# Patient Record
Sex: FEMALE | Race: BLACK OR AFRICAN AMERICAN | Employment: OTHER | URBAN - METROPOLITAN AREA
[De-identification: names, ages, dates, MRNs, and addresses within clinical notes are randomized per-mention and may not be internally consistent; named-entity substitution may affect disease eponyms.]

---

## 2019-03-08 ENCOUNTER — HOSPITAL ENCOUNTER (EMERGENCY)
Age: 49
Discharge: HOME | End: 2019-03-08
Attending: EMERGENCY MEDICINE
Payer: COMMERCIAL

## 2019-03-08 VITALS
DIASTOLIC BLOOD PRESSURE: 76 MMHG | SYSTOLIC BLOOD PRESSURE: 125 MMHG | HEIGHT: 63 IN | OXYGEN SATURATION: 99 % | WEIGHT: 200 LBS | TEMPERATURE: 98.4 F | BODY MASS INDEX: 35.44 KG/M2 | HEART RATE: 65 BPM | RESPIRATION RATE: 14 BRPM

## 2019-03-08 DIAGNOSIS — S39.012A STRAIN OF LUMBAR REGION, INITIAL ENCOUNTER: Primary | ICD-10-CM

## 2019-03-08 LAB
RAPID INFLUENZA  B AGN: NEGATIVE
RAPID INFLUENZA A AGN: NEGATIVE

## 2019-03-08 PROCEDURE — 99283 EMERGENCY DEPT VISIT LOW MDM: CPT

## 2019-03-08 PROCEDURE — 6370000000 HC RX 637 (ALT 250 FOR IP): Performed by: EMERGENCY MEDICINE

## 2019-03-08 PROCEDURE — 87804 INFLUENZA ASSAY W/OPTIC: CPT

## 2019-03-08 RX ORDER — BACLOFEN 20 MG/1
20 TABLET ORAL 3 TIMES DAILY
Qty: 60 TABLET | Refills: 1 | Status: SHIPPED | OUTPATIENT
Start: 2019-03-08

## 2019-03-08 RX ORDER — ONDANSETRON 4 MG/1
4 TABLET, FILM COATED ORAL ONCE
Status: COMPLETED | OUTPATIENT
Start: 2019-03-08 | End: 2019-03-08

## 2019-03-08 RX ORDER — SERTRALINE HYDROCHLORIDE 25 MG/1
25 TABLET, FILM COATED ORAL DAILY
COMMUNITY
End: 2022-09-07 | Stop reason: ALTCHOICE

## 2019-03-08 RX ORDER — CETIRIZINE HYDROCHLORIDE 10 MG/1
10 TABLET ORAL DAILY
COMMUNITY

## 2019-03-08 RX ORDER — HYDROCODONE BITARTRATE AND ACETAMINOPHEN 5; 325 MG/1; MG/1
1 TABLET ORAL EVERY 6 HOURS PRN
Qty: 12 TABLET | Refills: 0 | Status: SHIPPED | OUTPATIENT
Start: 2019-03-08 | End: 2019-03-13

## 2019-03-08 RX ORDER — ACYCLOVIR 200 MG/1
CAPSULE ORAL 2 TIMES DAILY
COMMUNITY

## 2019-03-08 RX ORDER — HYDROCODONE BITARTRATE AND ACETAMINOPHEN 5; 325 MG/1; MG/1
2 TABLET ORAL ONCE
Status: COMPLETED | OUTPATIENT
Start: 2019-03-08 | End: 2019-03-08

## 2019-03-08 RX ADMIN — HYDROCODONE BITARTRATE AND ACETAMINOPHEN 2 TABLET: 5; 325 TABLET ORAL at 02:28

## 2019-03-08 RX ADMIN — ONDANSETRON HYDROCHLORIDE 4 MG: 4 TABLET, FILM COATED ORAL at 02:28

## 2019-03-08 ASSESSMENT — PAIN DESCRIPTION - ORIENTATION
ORIENTATION: LOWER
ORIENTATION: LOWER

## 2019-03-08 ASSESSMENT — PAIN DESCRIPTION - LOCATION
LOCATION: BACK

## 2019-03-08 ASSESSMENT — PAIN SCALES - GENERAL
PAINLEVEL_OUTOF10: 10
PAINLEVEL_OUTOF10: 10
PAINLEVEL_OUTOF10: 5

## 2019-03-08 ASSESSMENT — PAIN DESCRIPTION - DESCRIPTORS
DESCRIPTORS: ACHING

## 2019-03-08 ASSESSMENT — PAIN - FUNCTIONAL ASSESSMENT: PAIN_FUNCTIONAL_ASSESSMENT: 0-10

## 2019-03-08 ASSESSMENT — PAIN DESCRIPTION - PAIN TYPE: TYPE: ACUTE PAIN

## 2022-08-11 ENCOUNTER — HOSPITAL ENCOUNTER (EMERGENCY)
Age: 52
Discharge: HOME | End: 2022-08-11
Attending: EMERGENCY MEDICINE
Payer: COMMERCIAL

## 2022-08-11 VITALS
OXYGEN SATURATION: 99 % | HEART RATE: 61 BPM | HEIGHT: 63 IN | BODY MASS INDEX: 34.64 KG/M2 | SYSTOLIC BLOOD PRESSURE: 132 MMHG | DIASTOLIC BLOOD PRESSURE: 90 MMHG | TEMPERATURE: 99.1 F | RESPIRATION RATE: 18 BRPM | WEIGHT: 195.5 LBS

## 2022-08-11 DIAGNOSIS — R51.9 NONINTRACTABLE HEADACHE, UNSPECIFIED CHRONICITY PATTERN, UNSPECIFIED HEADACHE TYPE: ICD-10-CM

## 2022-08-11 DIAGNOSIS — U07.1 COVID: Primary | ICD-10-CM

## 2022-08-11 DIAGNOSIS — H92.01 RIGHT EAR PAIN: ICD-10-CM

## 2022-08-11 LAB — SARS-COV-2, NAAT: DETECTED

## 2022-08-11 PROCEDURE — 96375 TX/PRO/DX INJ NEW DRUG ADDON: CPT

## 2022-08-11 PROCEDURE — 99284 EMERGENCY DEPT VISIT MOD MDM: CPT

## 2022-08-11 PROCEDURE — 96374 THER/PROPH/DIAG INJ IV PUSH: CPT

## 2022-08-11 PROCEDURE — 87635 SARS-COV-2 COVID-19 AMP PRB: CPT

## 2022-08-11 PROCEDURE — 6360000002 HC RX W HCPCS: Performed by: EMERGENCY MEDICINE

## 2022-08-11 RX ORDER — KETOROLAC TROMETHAMINE 30 MG/ML
30 INJECTION, SOLUTION INTRAMUSCULAR; INTRAVENOUS ONCE
Status: COMPLETED | OUTPATIENT
Start: 2022-08-11 | End: 2022-08-11

## 2022-08-11 RX ORDER — IBUPROFEN 600 MG/1
600 TABLET ORAL 4 TIMES DAILY PRN
Qty: 40 TABLET | Refills: 0 | Status: SHIPPED | OUTPATIENT
Start: 2022-08-11

## 2022-08-11 RX ORDER — METOCLOPRAMIDE HYDROCHLORIDE 5 MG/ML
10 INJECTION INTRAMUSCULAR; INTRAVENOUS ONCE
Status: COMPLETED | OUTPATIENT
Start: 2022-08-11 | End: 2022-08-11

## 2022-08-11 RX ORDER — 0.9 % SODIUM CHLORIDE 0.9 %
1000 INTRAVENOUS SOLUTION INTRAVENOUS ONCE
Status: DISCONTINUED | OUTPATIENT
Start: 2022-08-11 | End: 2022-08-11 | Stop reason: HOSPADM

## 2022-08-11 RX ORDER — ACETAMINOPHEN 325 MG/1
650 TABLET ORAL EVERY 6 HOURS PRN
Qty: 40 TABLET | Refills: 0 | Status: SHIPPED | OUTPATIENT
Start: 2022-08-11 | End: 2022-08-16

## 2022-08-11 RX ORDER — DIPHENHYDRAMINE HYDROCHLORIDE 50 MG/ML
25 INJECTION INTRAMUSCULAR; INTRAVENOUS ONCE
Status: COMPLETED | OUTPATIENT
Start: 2022-08-11 | End: 2022-08-11

## 2022-08-11 RX ADMIN — METOCLOPRAMIDE HYDROCHLORIDE 10 MG: 5 INJECTION INTRAMUSCULAR; INTRAVENOUS at 14:22

## 2022-08-11 RX ADMIN — Medication 1000 ML: at 14:20

## 2022-08-11 RX ADMIN — DIPHENHYDRAMINE HYDROCHLORIDE 25 MG: 50 INJECTION, SOLUTION INTRAMUSCULAR; INTRAVENOUS at 14:21

## 2022-08-11 RX ADMIN — KETOROLAC TROMETHAMINE 30 MG: 30 INJECTION, SOLUTION INTRAMUSCULAR at 14:18

## 2022-08-11 ASSESSMENT — ENCOUNTER SYMPTOMS
BACK PAIN: 0
COUGH: 1
ABDOMINAL PAIN: 0
VOMITING: 0
CHEST TIGHTNESS: 0
SHORTNESS OF BREATH: 0
RHINORRHEA: 0
DIARRHEA: 0

## 2022-08-11 ASSESSMENT — PAIN SCALES - GENERAL
PAINLEVEL_OUTOF10: 9
PAINLEVEL_OUTOF10: 10

## 2022-08-11 ASSESSMENT — PAIN DESCRIPTION - ORIENTATION: ORIENTATION: RIGHT

## 2022-08-11 ASSESSMENT — PAIN DESCRIPTION - PAIN TYPE: TYPE: ACUTE PAIN

## 2022-08-11 ASSESSMENT — PAIN DESCRIPTION - LOCATION: LOCATION: HEAD

## 2022-08-11 ASSESSMENT — PAIN DESCRIPTION - FREQUENCY: FREQUENCY: CONTINUOUS

## 2022-08-11 ASSESSMENT — PAIN DESCRIPTION - DESCRIPTORS: DESCRIPTORS: DISCOMFORT

## 2022-08-11 NOTE — Clinical Note
Morena Tellez was seen and treated in our emergency department on 8/11/2022. She may return to work on 08/17/2022. If you have any questions or concerns, please don't hesitate to call.       [address] [facility], DO

## 2022-08-11 NOTE — ED PROVIDER NOTES
Emergency Department Provider Note  Location: [address] [place] [address]  2022     Patient Identification  Albert Paulino is a 47 y.o. female    Chief Complaint  Ear Problem (Pt c/o R ear pain that began yesterday.) and Headache (Pt c/o headache that began last night before bed.)      HPI    (History provided by patient)    Patient is a 47 y.o. female with a significant PMHx below, that presents the emergency department today with headache, earache, congestion, cough, and some chills last night. Patient has not been tested for COVID-19. She denies any medication prior to arrival.  Sleeping has made her headache better. She denies any sudden onset headache, and it is not the worst headache of her life. No vision changes, numbness, weakness, tingling. No neck pain, and denies any other concerns including chest pain or shortness of breath. Initially today in the emergency department, patient did not want to be tested for COVID-19, however eventually agrees. I have reviewed the following nursing documentation:  Allergies: Allergies   Allergen Reactions    Sulfa Antibiotics Hives       Past medical history:  has a past medical history of Back pain, Depression, and Herpes. Past surgical history:  has a past surgical history that includes  section; Hysterectomy; Rotator cuff repair; and Carpal tunnel release. Home medications:   Prior to Admission medications    Medication Sig Start Date End Date Taking?  Authorizing Provider   acetaminophen (AMINOFEN) 325 MG tablet Take 2 tablets by mouth every 6 hours as needed for Pain 22 Yes Kaelyn Thornton DO   ibuprofen (ADVIL;MOTRIN) 600 MG tablet Take 1 tablet by mouth 4 times daily as needed for Pain 22  Yes Kaelyn Thornton DO   sertraline (ZOLOFT) 25 MG tablet Take 25 mg by mouth daily    Historical Provider, MD   cetirizine (ZYRTEC) 10 MG tablet Take 10 mg by mouth daily    Historical Provider, MD   acyclovir (ZOVIRAX) 200 MG capsule Take by mouth 2 times daily    Historical Provider, MD   baclofen (LIORESAL) 20 MG tablet Take 1 tablet by mouth 3 times daily 3/8/19   Amanda Rendon MD       Social history:  reports that she has been smoking cigarettes. She does not have any smokeless tobacco history on file. She reports that she does not currently use alcohol. She reports current drug use. Drug: Marijuana Berneta Sanford). Family history:  History reviewed. No pertinent family history. ROS  Review of Systems   Constitutional:  Positive for chills, fatigue and fever. Negative for activity change and appetite change. HENT:  Positive for congestion. Negative for rhinorrhea. Respiratory:  Positive for cough. Negative for chest tightness and shortness of breath. Cardiovascular:  Negative for chest pain and leg swelling. Gastrointestinal:  Negative for abdominal pain, diarrhea and vomiting. Genitourinary:  Negative for dysuria, flank pain and pelvic pain. Musculoskeletal:  Negative for back pain and neck pain. Skin:  Negative for rash and wound. Neurological:  Positive for headaches. Negative for weakness and numbness. Exam  Vitals:    08/11/22 1213   BP: (!) 132/90   Pulse: 61   Resp: 18   Temp: 99.1 °F (37.3 °C)   TempSrc: Oral   SpO2: 99%   Weight: 195 lb 8 oz (88.7 kg)   Height: 5' 3\" (1.6 m)         Physical Exam  Vitals reviewed. Constitutional:       General: She is not in acute distress. Appearance: Normal appearance. She is ill-appearing (.appear). Comments: Appears as if not feeling well, however interactive, conversational, pleasant, and in no acute clinical cardiopulmonary distress. HENT:      Head: Normocephalic and atraumatic. Right Ear: Tympanic membrane, ear canal and external ear normal.      Left Ear: Tympanic membrane, ear canal and external ear normal.      Nose: Congestion present.       Mouth/Throat:      Mouth: Mucous membranes are moist.      Pharynx: Oropharynx is clear.   Eyes:      General:         Right eye: No discharge. Left eye: No discharge. Conjunctiva/sclera: Conjunctivae normal.      Pupils: Pupils are equal, round, and reactive to light. Cardiovascular:      Rate and Rhythm: Normal rate and regular rhythm. Pulmonary:      Effort: Pulmonary effort is normal. No respiratory distress. Breath sounds: Normal breath sounds. Abdominal:      General: Abdomen is flat. There is no distension. Palpations: Abdomen is soft. Tenderness: There is no abdominal tenderness. Musculoskeletal:         General: No swelling or tenderness. Cervical back: Normal range of motion and neck supple. No rigidity or tenderness. Skin:     General: Skin is warm and dry. Neurological:      General: No focal deficit present. Mental Status: She is alert and oriented to person, place, and time. Motor: No weakness. Gait: Gait normal.   Psychiatric:         Mood and Affect: Mood normal.         Behavior: Behavior normal.         ED Course    ED Medication Orders (From admission, onward)      Start Ordered     Status Ordering Provider    08/11/22 1330 08/11/22 1316  ketorolac (TORADOL) injection 30 mg  ONCE         Last MAR action: Given - by Bean Rao on 08/11/22 at 1418 DYANA KWOK    08/11/22 1330 08/11/22 1316  diphenhydrAMINE (BENADRYL) injection 25 mg  ONCE         Last MAR action: Given - by Bean Rao on 08/11/22 at 1421 DYANA KWOK    08/11/22 1330 08/11/22 1316  metoclopramide (REGLAN) injection 10 mg  ONCE         Last MAR action: Given - by Bean Rao on 08/11/22 at 1422 DYANA KWOK    08/11/22 1330 08/11/22 1316  0.9 % sodium chloride bolus  ONCE         Last MAR action: Bolus from Bag - by Bean Rao on 08/11/22 at 1420 DYANA KWOK              Radiology  No results found.     Labs  Results for orders placed or performed during the hospital encounter of 08/11/22   COVID-19, Rapid Specimen: Nasopharyngeal Swab   Result Value Ref Range    SARS-CoV-2, NAAT DETECTED (AA) Not Detected       Procedures  Procedures      MDM  Patient seen and evaluated. Relevant records reviewed. - Patient is a 47 y.o. female with concerns of a headache, one-sided, very typical headache for her, and also congestion, cough, fevers subjective, and fatigue. Treated with the below medication administration, for symptomatic relief of patient's headache, she appears uncomfortable today in the ED. Improvement of her symptoms, patient will have a ride home. Further, patient is COVID-19 positive, likely the etiology of her symptomatology today in the ED. COVID care recommendations discussed; socially distance, rest, increase oral hydration, and use Tylenol or Motrin for myalgias or fever. Recommended to get a pulse oximetry at local store and return to the hospital immediately for any readings of below 92% while at rest or ambulating. Medications   0.9 % sodium chloride bolus (1,000 mLs IntraVENous Bolus from Bag 8/11/22 1420)   ketorolac (TORADOL) injection 30 mg (30 mg IntraVENous Given 8/11/22 1418)   diphenhydrAMINE (BENADRYL) injection 25 mg (25 mg IntraVENous Given 8/11/22 1421)   metoclopramide (REGLAN) injection 10 mg (10 mg IntraVENous Given 8/11/22 1422)       - I have a low concern for other emergent process, and do not see indication for further work-up in the ER, as it is unlikely  and poses more risk than benefit. - I discussed the results, including any incidental findings, with patient. Questions answered. Patient/family agreeable to plan and express understanding of plan. Disposition:  Discharge to home in fair condition. Is this patient to be included in the SEP-1 Core Measure due to severe sepsis or septic shock?    No   Exclusion criteria - the patient is NOT to be included for SEP-1 Core Measure due to:  Viral etiology found or highly suspected (including COVID-19) without concomitant bacterial infection      Clinical Impression:  1. COVID    2. Nonintractable headache, unspecified chronicity pattern, unspecified headache type    3. Right ear pain        Blood pressure (!) 132/90, pulse 61, temperature 99.1 °F (37.3 °C), temperature source Oral, resp. rate 18, height 5' 3\" (1.6 m), weight 195 lb 8 oz (88.7 kg), SpO2 99 %. Patient was given prescriptions for the following medications. I counseled patient how to take these medications. New Prescriptions    ACETAMINOPHEN (AMINOFEN) 325 MG TABLET    Take 2 tablets by mouth every 6 hours as needed for Pain    IBUPROFEN (ADVIL;MOTRIN) 600 MG TABLET    Take 1 tablet by mouth 4 times daily as needed for Pain       Disposition referral (if applicable):  [address] [facility] [address]  [address] [facility] [address]  [phone]    If symptoms worsen, As needed    [facility]  [address]  [facility] [address]  [phone]      If symptoms worsen, As needed      Inna MACDONALD DO, am the primary attending of record and contributed the majority of evaluation and treatment of emergent care for this encounter. This chart was generated in part by using Dragon Dictation system and may contain errors related to that system including errors in grammar, punctuation, and spelling, as well as words and phrases that may be inappropriate. If there are any questions or concerns please feel free to contact the dictating provider for clarification.      DYANA KWOK DO  [facility],   08/11/22 2543

## 2022-09-05 ENCOUNTER — APPOINTMENT (OUTPATIENT)
Dept: GENERAL RADIOLOGY | Age: 52
End: 2022-09-05
Payer: COMMERCIAL

## 2022-09-05 ENCOUNTER — HOSPITAL ENCOUNTER (EMERGENCY)
Age: 52
Discharge: HOME | End: 2022-09-05
Attending: EMERGENCY MEDICINE
Payer: COMMERCIAL

## 2022-09-05 VITALS
SYSTOLIC BLOOD PRESSURE: 122 MMHG | HEIGHT: 63 IN | RESPIRATION RATE: 10 BRPM | DIASTOLIC BLOOD PRESSURE: 69 MMHG | WEIGHT: 197.9 LBS | BODY MASS INDEX: 35.07 KG/M2 | TEMPERATURE: 98.6 F | OXYGEN SATURATION: 99 % | HEART RATE: 69 BPM

## 2022-09-05 DIAGNOSIS — M25.461 KNEE EFFUSION, RIGHT: Primary | ICD-10-CM

## 2022-09-05 PROCEDURE — 99283 EMERGENCY DEPT VISIT LOW MDM: CPT

## 2022-09-05 PROCEDURE — 73562 X-RAY EXAM OF KNEE 3: CPT

## 2022-09-05 RX ORDER — NAPROXEN 500 MG/1
500 TABLET ORAL 2 TIMES DAILY WITH MEALS
Qty: 30 TABLET | Refills: 0 | Status: SHIPPED | OUTPATIENT
Start: 2022-09-05

## 2022-09-05 ASSESSMENT — PAIN DESCRIPTION - LOCATION: LOCATION: KNEE

## 2022-09-05 ASSESSMENT — PAIN DESCRIPTION - PAIN TYPE: TYPE: ACUTE PAIN

## 2022-09-05 ASSESSMENT — PAIN DESCRIPTION - FREQUENCY: FREQUENCY: CONTINUOUS

## 2022-09-05 ASSESSMENT — PAIN DESCRIPTION - ORIENTATION: ORIENTATION: RIGHT;LEFT

## 2022-09-05 ASSESSMENT — PAIN DESCRIPTION - DESCRIPTORS: DESCRIPTORS: DISCOMFORT

## 2022-09-05 ASSESSMENT — PAIN SCALES - GENERAL: PAINLEVEL_OUTOF10: 6

## 2022-09-05 ASSESSMENT — PAIN - FUNCTIONAL ASSESSMENT: PAIN_FUNCTIONAL_ASSESSMENT: 0-10

## 2022-09-05 NOTE — Clinical Note
Lonnie Spine was seen and treated in our emergency department on 9/5/2022. She may return to work on 09/09/2022. If you have any questions or concerns, please don't hesitate to call.       Nancy Galeazzi, MD

## 2022-09-05 NOTE — DISCHARGE INSTRUCTIONS
Ice and elevate. Knee immobilizer for support and comfort for one week and as needed afterwards. Return for fever, redness or increased swelling to joint, trouble bending knee or other problems. MRI may be useful to determine cause of effusion if no improvement.  Followup with orthopedics

## 2022-09-07 ENCOUNTER — HOSPITAL ENCOUNTER (EMERGENCY)
Age: 52
Discharge: HOME | End: 2022-09-07
Attending: STUDENT IN AN ORGANIZED HEALTH CARE EDUCATION/TRAINING PROGRAM
Payer: COMMERCIAL

## 2022-09-07 ENCOUNTER — APPOINTMENT (OUTPATIENT)
Dept: GENERAL RADIOLOGY | Age: 52
End: 2022-09-07
Payer: COMMERCIAL

## 2022-09-07 VITALS
TEMPERATURE: 97.8 F | WEIGHT: 199.8 LBS | OXYGEN SATURATION: 100 % | RESPIRATION RATE: 12 BRPM | DIASTOLIC BLOOD PRESSURE: 80 MMHG | BODY MASS INDEX: 35.4 KG/M2 | HEIGHT: 63 IN | SYSTOLIC BLOOD PRESSURE: 143 MMHG | HEART RATE: 75 BPM

## 2022-09-07 DIAGNOSIS — M25.512 ACUTE PAIN OF LEFT SHOULDER: Primary | ICD-10-CM

## 2022-09-07 PROCEDURE — 73030 X-RAY EXAM OF SHOULDER: CPT

## 2022-09-07 PROCEDURE — 99284 EMERGENCY DEPT VISIT MOD MDM: CPT

## 2022-09-07 PROCEDURE — 6370000000 HC RX 637 (ALT 250 FOR IP): Performed by: STUDENT IN AN ORGANIZED HEALTH CARE EDUCATION/TRAINING PROGRAM

## 2022-09-07 PROCEDURE — 6360000002 HC RX W HCPCS: Performed by: STUDENT IN AN ORGANIZED HEALTH CARE EDUCATION/TRAINING PROGRAM

## 2022-09-07 PROCEDURE — 96372 THER/PROPH/DIAG INJ SC/IM: CPT

## 2022-09-07 RX ORDER — METHYLPREDNISOLONE 4 MG/1
TABLET ORAL
Qty: 1 KIT | Refills: 0 | Status: SHIPPED | OUTPATIENT
Start: 2022-09-07 | End: 2022-09-12

## 2022-09-07 RX ORDER — KETOROLAC TROMETHAMINE 15 MG/ML
15 INJECTION, SOLUTION INTRAMUSCULAR; INTRAVENOUS ONCE
Status: COMPLETED | OUTPATIENT
Start: 2022-09-07 | End: 2022-09-07

## 2022-09-07 RX ORDER — LIDOCAINE 4 G/G
1 PATCH TOPICAL ONCE
Status: DISCONTINUED | OUTPATIENT
Start: 2022-09-07 | End: 2022-09-07 | Stop reason: HOSPADM

## 2022-09-07 RX ORDER — VENLAFAXINE HYDROCHLORIDE 75 MG/1
CAPSULE, EXTENDED RELEASE ORAL DAILY
COMMUNITY
Start: 2022-02-28

## 2022-09-07 RX ORDER — LIDOCAINE 50 MG/G
1 PATCH TOPICAL DAILY
Qty: 10 PATCH | Refills: 0 | Status: SHIPPED | OUTPATIENT
Start: 2022-09-07 | End: 2022-09-17

## 2022-09-07 RX ADMIN — KETOROLAC TROMETHAMINE 15 MG: 15 INJECTION, SOLUTION INTRAMUSCULAR; INTRAVENOUS at 11:06

## 2022-09-07 ASSESSMENT — PAIN SCALES - GENERAL: PAINLEVEL_OUTOF10: 10

## 2022-09-12 ENCOUNTER — APPOINTMENT (OUTPATIENT)
Dept: GENERAL RADIOLOGY | Age: 52
End: 2022-09-12
Payer: COMMERCIAL

## 2022-09-12 ENCOUNTER — HOSPITAL ENCOUNTER (EMERGENCY)
Age: 52
Discharge: HOME | End: 2022-09-12
Attending: EMERGENCY MEDICINE
Payer: COMMERCIAL

## 2022-09-12 VITALS
HEART RATE: 74 BPM | WEIGHT: 198.4 LBS | TEMPERATURE: 97.6 F | DIASTOLIC BLOOD PRESSURE: 90 MMHG | BODY MASS INDEX: 35.15 KG/M2 | SYSTOLIC BLOOD PRESSURE: 123 MMHG | HEIGHT: 63 IN | OXYGEN SATURATION: 99 % | RESPIRATION RATE: 12 BRPM

## 2022-09-12 DIAGNOSIS — M79.602 LEFT ARM PAIN: Primary | ICD-10-CM

## 2022-09-12 DIAGNOSIS — G58.9 MONONEUROPATHY: ICD-10-CM

## 2022-09-12 LAB
A/G RATIO: 1.5 (ref 1.1–2.2)
ALBUMIN SERPL-MCNC: 4 G/DL (ref 3.4–5)
ALP BLD-CCNC: 55 U/L (ref 40–129)
ALT SERPL-CCNC: 15 U/L (ref 10–40)
ANION GAP SERPL CALCULATED.3IONS-SCNC: 6 MMOL/L (ref 3–16)
AST SERPL-CCNC: 17 U/L (ref 15–37)
BASOPHILS ABSOLUTE: 0.1 K/UL (ref 0–0.2)
BASOPHILS RELATIVE PERCENT: 1.1 %
BILIRUB SERPL-MCNC: 0.6 MG/DL (ref 0–1)
BUN BLDV-MCNC: 14 MG/DL (ref 7–20)
CALCIUM SERPL-MCNC: 9 MG/DL (ref 8.3–10.6)
CHLORIDE BLD-SCNC: 104 MMOL/L (ref 99–110)
CO2: 30 MMOL/L (ref 21–32)
CREAT SERPL-MCNC: 0.6 MG/DL (ref 0.6–1.1)
EOSINOPHILS ABSOLUTE: 0.3 K/UL (ref 0–0.6)
EOSINOPHILS RELATIVE PERCENT: 3.5 %
GFR AFRICAN AMERICAN: >60
GFR NON-AFRICAN AMERICAN: >60
GLUCOSE BLD-MCNC: 99 MG/DL (ref 70–99)
HCT VFR BLD CALC: 37.3 % (ref 36–48)
HEMOGLOBIN: 12.4 G/DL (ref 12–16)
LYMPHOCYTES ABSOLUTE: 3.8 K/UL (ref 1–5.1)
LYMPHOCYTES RELATIVE PERCENT: 48 %
MCH RBC QN AUTO: 30 PG (ref 26–34)
MCHC RBC AUTO-ENTMCNC: 33.3 G/DL (ref 31–36)
MCV RBC AUTO: 90.2 FL (ref 80–100)
MONOCYTES ABSOLUTE: 1.1 K/UL (ref 0–1.3)
MONOCYTES RELATIVE PERCENT: 13.3 %
NEUTROPHILS ABSOLUTE: 2.7 K/UL (ref 1.7–7.7)
NEUTROPHILS RELATIVE PERCENT: 34.1 %
PDW BLD-RTO: 14.8 % (ref 12.4–15.4)
PLATELET # BLD: 285 K/UL (ref 135–450)
PMV BLD AUTO: 7.6 FL (ref 5–10.5)
POTASSIUM REFLEX MAGNESIUM: 3.6 MMOL/L (ref 3.5–5.1)
RBC # BLD: 4.13 M/UL (ref 4–5.2)
SODIUM BLD-SCNC: 140 MMOL/L (ref 136–145)
TOTAL PROTEIN: 6.7 G/DL (ref 6.4–8.2)
TROPONIN: <0.01 NG/ML
WBC # BLD: 8 K/UL (ref 4–11)

## 2022-09-12 PROCEDURE — 93005 ELECTROCARDIOGRAM TRACING: CPT | Performed by: EMERGENCY MEDICINE

## 2022-09-12 PROCEDURE — 71045 X-RAY EXAM CHEST 1 VIEW: CPT

## 2022-09-12 PROCEDURE — 85025 COMPLETE CBC W/AUTO DIFF WBC: CPT

## 2022-09-12 PROCEDURE — 99285 EMERGENCY DEPT VISIT HI MDM: CPT

## 2022-09-12 PROCEDURE — 84484 ASSAY OF TROPONIN QUANT: CPT

## 2022-09-12 PROCEDURE — 80053 COMPREHEN METABOLIC PANEL: CPT

## 2022-09-12 RX ORDER — PREDNISONE 50 MG/1
50 TABLET ORAL DAILY
Qty: 5 TABLET | Refills: 0 | Status: SHIPPED | OUTPATIENT
Start: 2022-09-12 | End: 2022-09-17

## 2022-09-12 ASSESSMENT — PAIN DESCRIPTION - LOCATION: LOCATION: ARM

## 2022-09-12 ASSESSMENT — ENCOUNTER SYMPTOMS
PHOTOPHOBIA: 0
COUGH: 0
BACK PAIN: 0
RHINORRHEA: 0
NAUSEA: 0
WHEEZING: 0
DIARRHEA: 0
ABDOMINAL DISTENTION: 0
SHORTNESS OF BREATH: 0
VOMITING: 0

## 2022-09-12 ASSESSMENT — PAIN DESCRIPTION - ORIENTATION: ORIENTATION: LEFT

## 2022-09-12 ASSESSMENT — PAIN DESCRIPTION - PAIN TYPE: TYPE: ACUTE PAIN

## 2022-09-12 ASSESSMENT — PAIN SCALES - GENERAL: PAINLEVEL_OUTOF10: 10

## 2022-09-12 ASSESSMENT — PAIN DESCRIPTION - DESCRIPTORS: DESCRIPTORS: ACHING

## 2022-09-12 ASSESSMENT — PAIN DESCRIPTION - FREQUENCY: FREQUENCY: CONTINUOUS

## 2022-09-12 ASSESSMENT — PAIN - FUNCTIONAL ASSESSMENT
PAIN_FUNCTIONAL_ASSESSMENT: NONE - DENIES PAIN
PAIN_FUNCTIONAL_ASSESSMENT: 0-10

## 2022-09-12 NOTE — ED NOTES
Patient to ed with complaints of left arm pain which started last week on Wed. Patient denies injury.      Oh Temple RN  09/12/22 8643

## 2022-09-12 NOTE — ED PROVIDER NOTES
Emergency Department Provider Note  Location: [address] [place] [address]  2022     Patient Identification  Cortes Cormier is a 47 y.o. female    Chief Complaint  Arm Pain (left)          HPI  (History provided by patient)  This is a 76-year-old female who returns to the emergency department for her third visit this week not complaining of left-sided arm pain for 4 to 5 days duration. Described as a zapping pain goes down her left arm. She points to lateral bicep and the dorsal forearm. She denies any numbness or weakness. States that it is rapid onset and offset with no clear exacerbating or alleviating factors. She was seen earlier this week for shoulder blade pain and given Medrol Dosepak and other supportive care measures. She denies any shortness of breath. She initially denied any chest pain but when asked again she states that she is worried that this might be a cardiac issue. She reports that she was gassy a few days ago. No exertional symptoms no associated diaphoresis lightheadedness or other autonomic symptoms. I have reviewed the following nursing documentation:  Allergies: Allergies   Allergen Reactions    Latex     Sulfa Antibiotics Hives       Past medical history:  has a past medical history of Back pain, Depression, and Herpes. Past surgical history:  has a past surgical history that includes  section; Hysterectomy; Rotator cuff repair; and Carpal tunnel release. Home medications:   Prior to Admission medications    Medication Sig Start Date End Date Taking?  Authorizing Provider   predniSONE (DELTASONE) 50 MG tablet Take 1 tablet by mouth daily for 5 days 22 Yes Brodie Mcfadden MD   venlafaxine (EFFEXOR XR) 75 MG extended release capsule Take by mouth daily 22   Historical Provider, MD   lidocaine (LIDODERM) 5 % Place 1 patch onto the skin daily for 10 days 12 hours on, 12 hours off. 22  Bart Alvarez MD   naproxen (NAPROSYN) 500 MG tablet Take 1 tablet by mouth 2 times daily (with meals) 9/5/22   Jovana Garcia MD   acetaminophen (AMINOFEN) 325 MG tablet Take 2 tablets by mouth every 6 hours as needed for Pain 8/11/22 8/16/22  Kaelyn Thornton DO   ibuprofen (ADVIL;MOTRIN) 600 MG tablet Take 1 tablet by mouth 4 times daily as needed for Pain 8/11/22   Kaelyn Thornton DO   cetirizine (ZYRTEC) 10 MG tablet Take 10 mg by mouth daily    Historical Provider, MD   acyclovir (ZOVIRAX) 200 MG capsule Take by mouth 2 times daily    Historical Provider, MD   baclofen (LIORESAL) 20 MG tablet Take 1 tablet by mouth 3 times daily 3/8/19   Lilly Joyce MD       Social history:  reports that she has been smoking cigarettes. She has never used smokeless tobacco. She reports that she does not currently use alcohol. She reports current drug use. Drug: Marijuana Charmayne Stai). Family history:  History reviewed. No pertinent family history. ROS  Review of Systems   Constitutional:  Negative for chills and fever. HENT:  Negative for congestion and rhinorrhea. Eyes:  Negative for photophobia and visual disturbance. Respiratory:  Negative for cough, shortness of breath and wheezing. Cardiovascular:  Negative for chest pain and palpitations. Gastrointestinal:  Negative for abdominal distention, diarrhea, nausea and vomiting. Genitourinary:  Negative for dysuria and hematuria. Musculoskeletal:  Negative for back pain and neck pain. Skin:  Negative for rash and wound. Neurological:  Negative for syncope and weakness. Psychiatric/Behavioral:  Negative for agitation and confusion. Exam  ED Triage Vitals [09/12/22 0722]   BP Temp Temp Source Heart Rate Resp SpO2 Height Weight   (!) 123/90 97.6 °F (36.4 °C) Oral 74 12 99 % 5' 3\" (1.6 m) 198 lb 6.4 oz (90 kg)       Physical Exam  Vitals and nursing note reviewed. Constitutional:       General: She is not in acute distress. Appearance: She is well-developed. HENT:      Head: Normocephalic and atraumatic. Nose: Nose normal. No congestion. Eyes:      General: No scleral icterus. Extraocular Movements: Extraocular movements intact. Cardiovascular:      Rate and Rhythm: Normal rate and regular rhythm. Heart sounds: No murmur heard. Pulmonary:      Effort: Pulmonary effort is normal.      Breath sounds: Normal breath sounds. Abdominal:      General: There is no distension. Palpations: Abdomen is soft. Tenderness: There is no abdominal tenderness. Musculoskeletal:         General: No deformity. Normal range of motion. Cervical back: Normal range of motion and neck supple. No rigidity or tenderness. Comments: Sensation over occiput and posterior neck intact, normal flexion/ext of shoulder, elbow, wrist and fingers, normal finger abduction against resistance. Sensation of trunk intact throughout thoracic dermatomes. Skin:     General: Skin is warm. Findings: No rash. Neurological:      Mental Status: She is alert and oriented to person, place, and time. Motor: No abnormal muscle tone. Coordination: Coordination normal.   Psychiatric:         Mood and Affect: Mood normal.         Behavior: Behavior normal.         ED Course    ED Medication Orders (From admission, onward)      None            EKG  Rhythm is normal sinus  Rate is 70  Axis is normal  Conduction Abnormalities none noted  No STEMI criteria  Qtc is 452      Radiology  No results found.       Labs  Results for orders placed or performed during the hospital encounter of 09/12/22   CBC with Auto Differential   Result Value Ref Range    WBC 8.0 4.0 - 11.0 K/uL    RBC 4.13 4.00 - 5.20 M/uL    Hemoglobin 12.4 12.0 - 16.0 g/dL    Hematocrit 37.3 36.0 - 48.0 %    MCV 90.2 80.0 - 100.0 fL    MCH 30.0 26.0 - 34.0 pg    MCHC 33.3 31.0 - 36.0 g/dL    RDW 14.8 12.4 - 15.4 %    Platelets 048 098 - 446 K/uL    MPV 7.6 5.0 - 10.5 fL    Neutrophils % 34.1 % Lymphocytes % 48.0 %    Monocytes % 13.3 %    Eosinophils % 3.5 %    Basophils % 1.1 %    Neutrophils Absolute 2.7 1.7 - 7.7 K/uL    Lymphocytes Absolute 3.8 1.0 - 5.1 K/uL    Monocytes Absolute 1.1 0.0 - 1.3 K/uL    Eosinophils Absolute 0.3 0.0 - 0.6 K/uL    Basophils Absolute 0.1 0.0 - 0.2 K/uL   CMP w/ Reflex to MG   Result Value Ref Range    Sodium 140 136 - 145 mmol/L    Potassium reflex Magnesium 3.6 3.5 - 5.1 mmol/L    Chloride 104 99 - 110 mmol/L    CO2 30 21 - 32 mmol/L    Anion Gap 6 3 - 16    Glucose 99 70 - 99 mg/dL    BUN 14 7 - 20 mg/dL    Creatinine 0.6 0.6 - 1.1 mg/dL    GFR Non-African American >60 >60    GFR African American >60 >60    Calcium 9.0 8.3 - 10.6 mg/dL    Total Protein 6.7 6.4 - 8.2 g/dL    Albumin 4.0 3.4 - 5.0 g/dL    Albumin/Globulin Ratio 1.5 1.1 - 2.2    Total Bilirubin 0.6 0.0 - 1.0 mg/dL    Alkaline Phosphatase 55 40 - 129 U/L    ALT 15 10 - 40 U/L    AST 17 15 - 37 U/L   Troponin   Result Value Ref Range    Troponin <0.01 <0.01 ng/mL   EKG 12 Lead   Result Value Ref Range    Ventricular Rate 69 BPM    Atrial Rate 69 BPM    P-R Interval 142 ms    QRS Duration 82 ms    Q-T Interval 422 ms    QTc Calculation (Bazett) 452 ms    P Axis 55 degrees    R Axis 34 degrees    T Axis 35 degrees    Diagnosis       Normal sinus rhythmNormal ECGConfirmed by Jennifer Perez MD, Bairon Hidalgo ([id number]) on 9/13/2022 9:32:21 AM         Procedures  Procedures      MDM  Patient seen and evaluated. Relevant records reviewed. - Patient is 47 y.o. female presented for atypical neck pain as described above  - Exam showed as noted above  - Workup here consistent with suspected mononeuropathy. No evidence of spinal cord impingement on exam.  Not consistent with ACS. Lab work and EKG were obtained for further restratification which were unlikely to be cardiopulmonary in nature or vascular in nature.   - Patient treated as noted above  - I have a low concern for  other emergent process, and do not see indication for further work-up in the ER, as it is unlikely  and poses more risk than benefit. - I discussed the results, including any incidental findings, with patient. Questions answered. We agreed to discharge. Patient/family agreeable to plan and express understanding of plan. Clinical Impression:  1. Left arm pain    2. Mononeuropathy          Disposition:  Discharge to home in good condition. Blood pressure (!) 123/90, pulse 74, temperature 97.6 °F (36.4 °C), temperature source Oral, resp. rate 12, height 5' 3\" (1.6 m), weight 198 lb 6.4 oz (90 kg), SpO2 99 %. Patient was given scripts for the following medications. I counseled patient how to take these medications. Discharge Medication List as of 9/12/2022  9:14 AM        START taking these medications    Details   predniSONE (DELTASONE) 50 MG tablet Take 1 tablet by mouth daily for 5 days, Disp-5 tablet, R-0Normal             Disposition referral (if applicable): Andre Lazo  [phone]          Duyen Chow, [address] [place] [address],[address] [facility]  [phone]    In 1 week  As needed, If symptoms worsen    IBrenda, russ the primary attending of record and contributed the majority of evaluation and treatment of emergent care for this encounter. Total critical care time is 0 minutes, which excludes separately billable procedures and updating family. Time spent is specifically for management of the presenting complaint and symptoms initially, direct bedside care, reevaluation, review of records, and consultation. There was a high probability of clinically significant life-threatening deterioration in the patient's condition, which required my urgent intervention. This chart was generated in part by using Dragon Dictation system and may contain errors related to that system including errors in grammar, punctuation, and spelling, as well as words and phrases that may be inappropriate.  If there are any questions or concerns please feel free to contact the dictating provider for clarification.      MD James Cody MD  09/16/22 6243

## 2022-09-12 NOTE — ED NOTES
Patient given prescription, discharge instructions verbal and written, patient verbalized understanding. Alert/oriented X4, Clear speech.   Patient exhibits no distress, ambulates with steady gait per self leaving unit, no further request.      Syl Pearson RN  09/12/22 [id number]

## 2022-09-12 NOTE — DISCHARGE INSTRUCTIONS
You are seen in the emergency department for atypical arm pain. Your testing here is overall reassuring. This may be caused by a nerve impingement in the setting of your muscle strain of her left trapezius. Recommend you continue the current therapy that you are already on. We will represcribe a steroid to continue. Follow-up with your primary doctor in 1 week. Return to emergency department if you develop any severe chest pain difficulty breathing loss of consciousness or other emergent concerns we discussed.

## 2022-09-13 LAB
EKG ATRIAL RATE: 69 BPM
EKG DIAGNOSIS: NORMAL
EKG P AXIS: 55 DEGREES
EKG P-R INTERVAL: 142 MS
EKG Q-T INTERVAL: 422 MS
EKG QRS DURATION: 82 MS
EKG QTC CALCULATION (BAZETT): 452 MS
EKG R AXIS: 34 DEGREES
EKG T AXIS: 35 DEGREES
EKG VENTRICULAR RATE: 69 BPM

## 2022-09-13 PROCEDURE — 93010 ELECTROCARDIOGRAM REPORT: CPT | Performed by: INTERNAL MEDICINE

## 2024-03-03 NOTE — ED PROVIDER NOTES
[facility] EMERGENCY DEPT VISIT      Patient Identification  Camryn Mcgregor is a 47 y.o. female. Chief Complaint   Knee Pain (Right and left knee pain although more concerned about right knee with pain for the past 4 days and shooting pain to her foot and swollen)      History of Present Illness: This is a  47 y.o. female who presents ambulatory  to the ED with complaints of 4 day h/o  right knee pain and swelling. No known injury. No h/o gout. No DM. No injections or wounds to knee. No recent febrile illnesses. Hurts to walk and feels like it will buckle at times. Pain then shoots to foot. Past Medical History:   Diagnosis Date    Back pain     Depression     Herpes        Past Surgical History:   Procedure Laterality Date    CARPAL TUNNEL RELEASE       SECTION      HYSTERECTOMY (CERVIX STATUS UNKNOWN)      ROTATOR CUFF REPAIR         No current facility-administered medications for this encounter.     Current Outpatient Medications:     naproxen (NAPROSYN) 500 MG tablet, Take 1 tablet by mouth 2 times daily (with meals), Disp: 30 tablet, Rfl: 0    venlafaxine (EFFEXOR XR) 75 MG extended release capsule, Take by mouth daily, Disp: , Rfl:     methylPREDNISolone (MEDROL DOSEPACK) 4 MG tablet, Take by mouth., Disp: 1 kit, Rfl: 0    lidocaine (LIDODERM) 5 %, Place 1 patch onto the skin daily for 10 days 12 hours on, 12 hours off., Disp: 10 patch, Rfl: 0    acetaminophen (AMINOFEN) 325 MG tablet, Take 2 tablets by mouth every 6 hours as needed for Pain, Disp: 40 tablet, Rfl: 0    ibuprofen (ADVIL;MOTRIN) 600 MG tablet, Take 1 tablet by mouth 4 times daily as needed for Pain, Disp: 40 tablet, Rfl: 0    cetirizine (ZYRTEC) 10 MG tablet, Take 10 mg by mouth daily, Disp: , Rfl:     acyclovir (ZOVIRAX) 200 MG capsule, Take by mouth 2 times daily, Disp: , Rfl:     baclofen (LIORESAL) 20 MG tablet, Take 1 tablet by mouth 3 times daily, Disp: 60 tablet, Rfl: 1    Allergies   Allergen Reactions    Latex     Sulfa Recent Review Flowsheet Data     Date 2/11/2022    Adult PHQ 2 Score 2    Adult PHQ 2 Interpretation No further screening needed    Little interest or pleasure in activity? 1    Feeling down, depressed or hopeless? 1    Adult PHQ 9 Score 6    Adult PHQ 9 Interpretation Mild Depression    Trouble falling or staying asleep or sleeping all the time? 2    Feeling tired or having little energy? 1    Poor appetite or overeating? 0    Feeling bad about yourself or that you are a failure or have let yourself or family down? 1    Trouble concentrating on things such as reading the newspaper or watching TV? 0    Moving or speaking slowly that other people have noticed or the opposite - being so fidgety or restless that you have been moving around a lot more than usual? 0    Thoughts that you would be better off dead or of hurting yourself in some way? 0         Antibiotics Hives       Social History     Socioeconomic History    Marital status: Single     Spouse name: Not on file    Number of children: Not on file    Years of education: Not on file    Highest education level: Not on file   Occupational History    Not on file   Tobacco Use    Smoking status: Every Day     Types: Cigarettes    Smokeless tobacco: Not on file   Substance and Sexual Activity    Alcohol use: Not Currently    Drug use: Yes     Types: Marijuana Ed Ring)    Sexual activity: Not on file   Other Topics Concern    Not on file   Social History Narrative    Not on file     Social Determinants of Health     Financial Resource Strain: Not on file   Food Insecurity: Not on file   Transportation Needs: Not on file   Physical Activity: Not on file   Stress: Not on file   Social Connections: Not on file   Intimate Partner Violence: Not on file   Housing Stability: Not on file       Nursing Notes Reviewed      ROS:  General: no fever  Musculoskeletal: +arthralgia, no myalgia, no back pain,  + joint swelling  NEURO: no numbness, no weakness  DERM: no rash, no erythema, no ecchymosis, no wounds      PHYSICAL EXAM:  GENERAL APPEARANCE: Lonnie Spine is in no acute respiratory distress. Awake and alert. VITAL SIGNS:   ED Triage Vitals [09/05/22 1323]   Enc Vitals Group      /69      Heart Rate 69      Resp 10      Temp 98.6 °F (37 °C)      Temp Source Oral      SpO2 99 %      Weight 197 lb 14.4 oz (89.8 kg)      Height 5' 3\" (1.6 m)      Head Circumference       Peak Flow       Pain Score       Pain Loc       Pain Edu? Excl. in [address]? HEAD: Normocephalic, atraumatic. EYES:  Extraocular muscles are intact. Conjunctivas are pink. Negative scleral icterus. ENT:  Mucous membranes are moist.    NECK: Nontender and supple. CHEST: Clear to auscultation bilaterally. No rales, rhonchi, or wheezing. HEART:  Regular rate and rhythm. No murmurs. Strong and equal pulses in the upper and lower extremities. ABDOMEN: Soft,  nondistended, positive bowel sounds. abdomen is nontender. MUSCULOSKELETAL:  Active range of motion of the upper and lower extremities. No edema. Righ tknee effusion. No erythema. No wounds. Able to flex to 90 degrees and fully straighten. Strong pedal pulses  NEUROLOGICAL: Awake, alert and oriented x 3. Power intact in the upper and lower extremities. DERMATOLOGIC: No petechiae, rashes, or ecchymoses. ED COURSE AND MEDICAL DECISION MAKING:      Radiology:  All plain films have been evaluated by myself. They may have been overread by radiologist as noted in chart. Other radiologic studies (i.e. CT, MRI, ultrasounds, etc ) have been interpreted by radiologist.     XR KNEE RIGHT (3 VIEWS)   Final Result      Suprapatellar knee joint effusion. No other acute findings. XR KNEE RIGHT (3 VIEWS)    Result Date: 9/5/2022  RIGHT KNEE: HISTORY: Pain and swelling. TECHNIQUE: AP, lateral and axial patellar views are obtained. FINDINGS: Bony structures are intact. Knee joint compartments are maintained. There is a suprapatellar joint effusion. Soft tissues are otherwise unremarkable. Suprapatellar knee joint effusion. No other acute findings. Labs:  No results found for this visit on 09/05/22. Treatment in the department:  Patient received Medications - No data to display     Knee immobilizer placed    Medical decision making:  Patient with atraumatic right knee pain. Has swelling and effusion with popping with movement. No erythema or warmth and still has good ROM. Does not seem likely to have septic joint and no risk factors. No h/o gout either. I estimate there is LOW risk for FRACTURE, DISLOCATION, COMPARTMENT SYNDROME, DEEP VENOUS THROMBOSIS, SEPTIC ARTHRITIS, OSTEOMYELITIS, TENDON OR NEUROVASCULAR INJURY, thus I consider the discharge disposition reasonable.  Oseas Ramon and I have discussed the diagnosis and risks, and we agree with discharging home to follow-up with their primary doctor or the referral orthopedist. We also discussed returning to the Emergency Department immediately if new or worsening symptoms occur. Clinical Impression:  1. Knee effusion, right        Dispo:  Patient will be discharged  at this time. Patient was informed of this decision and agrees with plan. I have discussed lab and xray findings with patient and they understand. Questions were answered to the best of my ability. Followup:  Estefani Ibarra DO  [address]  [phone]    Schedule an appointment as soon as possible for a visit       Discharge vitals:  Blood pressure 122/69, pulse 69, temperature 98.6 °F (37 °C), temperature source Oral, resp. rate 10, height 5' 3\" (1.6 m), weight 197 lb 14.4 oz (89.8 kg), SpO2 99 %. Prescriptions given:   Discharge Medication List as of 9/5/2022  3:51 PM        START taking these medications    Details   naproxen (NAPROSYN) 500 MG tablet Take 1 tablet by mouth 2 times daily (with meals), Disp-30 tablet, R-0Normal               This chart was created using dragon voice recognition software.         Priscilla Nina MD  09/07/22 9960 English